# Patient Record
Sex: FEMALE | Race: BLACK OR AFRICAN AMERICAN | NOT HISPANIC OR LATINO | Employment: UNEMPLOYED | URBAN - METROPOLITAN AREA
[De-identification: names, ages, dates, MRNs, and addresses within clinical notes are randomized per-mention and may not be internally consistent; named-entity substitution may affect disease eponyms.]

---

## 2023-03-22 ENCOUNTER — TELEMEDICINE (OUTPATIENT)
Dept: PHARMACY | Facility: HOSPITAL | Age: 63
End: 2023-03-22
Payer: MEDICARE

## 2023-03-22 DIAGNOSIS — R79.89 ELEVATED PARATHYROID HORMONE: Primary | ICD-10-CM

## 2023-03-22 DIAGNOSIS — Z78.0 ASYMPTOMATIC MENOPAUSAL STATE: Primary | ICD-10-CM

## 2023-03-22 DIAGNOSIS — E11.9 TYPE 2 DIABETES MELLITUS WITHOUT COMPLICATION, WITHOUT LONG-TERM CURRENT USE OF INSULIN (MULTI): ICD-10-CM

## 2023-03-22 DIAGNOSIS — E11.9 TYPE 2 DIABETES MELLITUS WITHOUT COMPLICATION, WITHOUT LONG-TERM CURRENT USE OF INSULIN (MULTI): Primary | ICD-10-CM

## 2023-03-22 RX ORDER — IPRATROPIUM BROMIDE AND ALBUTEROL SULFATE 2.5; .5 MG/3ML; MG/3ML
3 SOLUTION RESPIRATORY (INHALATION)
COMMUNITY
Start: 2022-11-16

## 2023-03-22 RX ORDER — HYDROXYCHLOROQUINE SULFATE 200 MG/1
1 TABLET, FILM COATED ORAL 2 TIMES DAILY
COMMUNITY
Start: 2015-06-05 | End: 2023-07-24

## 2023-03-22 RX ORDER — BIMATOPROST 0.1 MG/ML
1 SOLUTION/ DROPS OPHTHALMIC NIGHTLY
COMMUNITY
Start: 2022-10-26

## 2023-03-22 RX ORDER — ERGOCALCIFEROL 1.25 MG/1
50000 CAPSULE ORAL
COMMUNITY
Start: 2020-09-27

## 2023-03-22 RX ORDER — HYDROCHLOROTHIAZIDE 12.5 MG/1
12.5 TABLET ORAL DAILY PRN
COMMUNITY
Start: 2023-03-09 | End: 2023-05-22 | Stop reason: SDUPTHER

## 2023-03-22 RX ORDER — ALBUTEROL SULFATE 90 UG/1
1-2 AEROSOL, METERED RESPIRATORY (INHALATION)
COMMUNITY
Start: 2022-11-16 | End: 2024-01-24 | Stop reason: SDUPTHER

## 2023-03-22 RX ORDER — DORZOLAMIDE HYDROCHLORIDE AND TIMOLOL MALEATE 20; 5 MG/ML; MG/ML
1 SOLUTION/ DROPS OPHTHALMIC 2 TIMES DAILY
COMMUNITY
Start: 2023-03-15

## 2023-03-22 RX ORDER — MELOXICAM 15 MG/1
15 TABLET ORAL DAILY
COMMUNITY
End: 2024-01-24 | Stop reason: SDUPTHER

## 2023-03-22 RX ORDER — METFORMIN HYDROCHLORIDE 500 MG/1
500 TABLET, EXTENDED RELEASE ORAL DAILY
COMMUNITY
Start: 2023-01-19 | End: 2023-05-22 | Stop reason: SDUPTHER

## 2023-03-22 RX ORDER — LOSARTAN POTASSIUM 50 MG/1
1 TABLET ORAL 2 TIMES DAILY
COMMUNITY
Start: 2023-03-09 | End: 2023-06-23

## 2023-03-22 RX ORDER — LATANOPROST 50 UG/ML
1 SOLUTION/ DROPS OPHTHALMIC NIGHTLY
COMMUNITY
Start: 2023-01-18

## 2023-03-22 RX ORDER — NETARSUDIL 0.2 MG/ML
1 SOLUTION/ DROPS OPHTHALMIC; TOPICAL EVERY EVENING
COMMUNITY
Start: 2022-03-30

## 2023-03-22 RX ORDER — MONTELUKAST SODIUM 10 MG/1
10 TABLET ORAL NIGHTLY
COMMUNITY
End: 2024-01-24 | Stop reason: SDUPTHER

## 2023-03-22 RX ORDER — FLUTICASONE PROPIONATE 50 MCG
2 SPRAY, SUSPENSION (ML) NASAL DAILY
COMMUNITY
Start: 2022-05-05

## 2023-03-22 RX ORDER — BRIMONIDINE TARTRATE 2 MG/ML
1 SOLUTION/ DROPS OPHTHALMIC 2 TIMES DAILY
COMMUNITY
Start: 2023-02-03

## 2023-03-22 NOTE — PROGRESS NOTES
Subjective   Patient ID: Marcella Dean is a 62 y.o. female who presents for Diabetes.  Diabetes  She presents for her follow-up diabetic visit. She has type 2 diabetes mellitus. Her disease course has been stable. There are no hypoglycemic associated symptoms. There are no hypoglycemic complications. Current diabetic treatment includes oral agent (monotherapy). She is compliant with treatment all of the time. She is following a generally healthy and diabetic diet. Prior visit with dietitian: Scheduling soon. Exercise: Plans to start. An ACE inhibitor/angiotensin II receptor blocker is being taken. Eye exam is current.       Referring Provider: TARA Sofia-CNP     Objective     There were no vitals taken for this visit.     LAB  Lab Results   Component Value Date    BILITOT 0.7 11/16/2022    CALCIUM 10.0 11/16/2022    CO2 29 11/16/2022     11/16/2022    CREATININE 0.69 11/16/2022    GLUCOSE 102 (H) 11/16/2022    ALKPHOS 61 11/16/2022    K 4.1 11/16/2022    PROT 8.2 11/16/2022     11/16/2022    AST 14 11/16/2022    ALT 11 11/16/2022    BUN 8 11/16/2022    ANIONGAP 14 11/16/2022    ALBUMIN 4.6 11/16/2022    GFRF >90 11/16/2022     No results found for: TRIG, CHOL, LDLCALC, HDL  Lab Results   Component Value Date    HGBA1C 7.0 (A) 11/16/2022       Assessment/Plan         Problem List Items Addressed This Visit          Endocrine/Metabolic     Patient reports well controlled SMBG readings. No SHARMILA from Metformin. Focused on lifestyle changes.     Continue Metformin          Type 2 diabetes mellitus without complication, without long-term current use of insulin (CMS/[facility])     Follow-up in 4 weeks     Brittny Pittman PharmD [facility]  Clinical Pharmacist     Verbal consent to manage patient's drug therapy was obtained from the patient. They were informed they may decline to participate or withdraw from participation in pharmacy services at any time.

## 2023-03-22 NOTE — ASSESSMENT & PLAN NOTE
Patient reports well controlled SMBG readings. No SHARMILA from Metformin. Focused on lifestyle changes.     Continue Metformin

## 2023-04-06 ENCOUNTER — TELEMEDICINE (OUTPATIENT)
Dept: PRIMARY CARE | Facility: CLINIC | Age: 63
End: 2023-04-06
Payer: MEDICARE

## 2023-04-06 DIAGNOSIS — R42 DIZZINESS: ICD-10-CM

## 2023-04-06 DIAGNOSIS — E11.9 TYPE 2 DIABETES MELLITUS WITHOUT COMPLICATION, WITHOUT LONG-TERM CURRENT USE OF INSULIN (MULTI): Primary | ICD-10-CM

## 2023-04-06 DIAGNOSIS — G47.33 OSA (OBSTRUCTIVE SLEEP APNEA): ICD-10-CM

## 2023-04-06 LAB
ALBUMIN (MG/L) IN URINE: 9.5 MG/L
ALBUMIN/CREATININE (UG/MG) IN URINE: 6.4 UG/MG CRT (ref 0–30)
ANION GAP IN SER/PLAS: 13 MMOL/L (ref 10–20)
CALCIUM (MG/DL) IN SER/PLAS: 10 MG/DL (ref 8.6–10.6)
CARBON DIOXIDE, TOTAL (MMOL/L) IN SER/PLAS: 29 MMOL/L (ref 21–32)
CHLORIDE (MMOL/L) IN SER/PLAS: 102 MMOL/L (ref 98–107)
CHOLESTEROL IN LDL (MG/DL) IN SER/PLAS BY DIRECT ASSAY: 133 MG/DL (ref 0–129)
CREATININE (MG/DL) IN SER/PLAS: 0.69 MG/DL (ref 0.5–1.05)
CREATININE (MG/DL) IN URINE: 148 MG/DL (ref 20–320)
ESTIMATED AVERAGE GLUCOSE FOR HBA1C: 157 MG/DL
GFR FEMALE: >90 ML/MIN/1.73M2
GLUCOSE (MG/DL) IN SER/PLAS: 114 MG/DL (ref 74–99)
HEMOGLOBIN A1C/HEMOGLOBIN TOTAL IN BLOOD: 7.1 %
POTASSIUM (MMOL/L) IN SER/PLAS: 3.5 MMOL/L (ref 3.5–5.3)
SODIUM (MMOL/L) IN SER/PLAS: 140 MMOL/L (ref 136–145)
UREA NITROGEN (MG/DL) IN SER/PLAS: 14 MG/DL (ref 6–23)

## 2023-04-06 PROCEDURE — 99215 OFFICE O/P EST HI 40 MIN: CPT | Performed by: NURSE PRACTITIONER

## 2023-04-06 NOTE — PROGRESS NOTES
Subjective   Patient ID: Marcella Dean is a 62 y.o. female who presents for Follow-up (Diabetes sleep apnea//).  The patient does not know how to use her cpap machine and would like to be shown how to use it  , put on the mask etc   She can follow up in Sleep medicine to assist her or come back to the office with her machine for a follow up   She gets very dizzy at times   She thinks it may be from taking the metformin but is not sure what is causing it .   She thinks when she started to take the metformin she started to get.   Her last aic level is 7.1 the aic level before that was 7.1   She has not checked her sugar when she is dizzy. She has had a borderline low tsh in the past but the last one done was normal in November 22 .  I suggested she get a pulse oximeter and try it when she feels like that .   I will have a pharmacist address her use of metformin  to see if she can use another use of another medications for her diabetes . She may need to drink more water.        Review of Systems    Objective   There were no vitals taken for this visit.    Assessment/Plan      Type 2 diabetes without long term use of insulin  VIOLETA   Dizziness      Referral to adult sleep medicine   Referral to clinical pharmacy

## 2023-04-17 ENCOUNTER — APPOINTMENT (OUTPATIENT)
Dept: PHARMACY | Facility: HOSPITAL | Age: 63
End: 2023-04-17
Payer: MEDICARE

## 2023-04-24 ENCOUNTER — TELEMEDICINE (OUTPATIENT)
Dept: PHARMACY | Facility: HOSPITAL | Age: 63
End: 2023-04-24
Payer: MEDICARE

## 2023-04-24 DIAGNOSIS — E11.9 TYPE 2 DIABETES MELLITUS WITHOUT COMPLICATION, WITHOUT LONG-TERM CURRENT USE OF INSULIN (MULTI): ICD-10-CM

## 2023-04-24 RX ORDER — ROSUVASTATIN CALCIUM 5 MG/1
5 TABLET, COATED ORAL DAILY
Qty: 90 TABLET | Refills: 0 | Status: SHIPPED | OUTPATIENT
Start: 2023-04-24 | End: 2023-05-26

## 2023-04-24 NOTE — ASSESSMENT & PLAN NOTE
Home BG within range. 1 PPG reading of 185 mg/dL. Not sure dizziness is related to Metformin.     1 .CONTINUE Metformin  mg once daily     Consider switch to Farxiga in future if dizziness persists.     LDL Elevated: Start Statin     START Rosuvastatin 5 mg once daily

## 2023-04-24 NOTE — PROGRESS NOTES
Subjective   Patient ID: Marcella Dean is a 62 y.o. female who presents for Diabetes.  Diabetes  She presents for her follow-up diabetic visit. She has type 2 diabetes mellitus. Her disease course has been stable. There are no hypoglycemic associated symptoms. There are no hypoglycemic complications. Current diabetic treatment includes oral agent (monotherapy). She is compliant with treatment all of the time. She is following a generally healthy and diabetic diet. Prior visit with dietitian: Scheduling soon. Exercise: Plans to start. An ACE inhibitor/angiotensin II receptor blocker is being taken. Eye exam is current.       Referring Provider: TARA Sofia-CNP     Objective     There were no vitals taken for this visit.     LAB  Lab Results   Component Value Date    BILITOT 0.7 11/16/2022    CALCIUM 10.0 04/06/2023    CO2 29 04/06/2023     04/06/2023    CREATININE 0.69 04/06/2023    GLUCOSE 114 (H) 04/06/2023    ALKPHOS 61 11/16/2022    K 3.5 04/06/2023    PROT 8.2 11/16/2022     04/06/2023    AST 14 11/16/2022    ALT 11 11/16/2022    BUN 14 04/06/2023    ANIONGAP 13 04/06/2023    ALBUMIN 4.6 11/16/2022    GFRF >90 04/06/2023     No results found for: TRIG, CHOL, LDLCALC, HDL  Lab Results   Component Value Date    HGBA1C 7.1 (A) 04/06/2023     The ASCVD Risk score (Carrie DK, et al., 2019) failed to calculate for the following reasons:    Cannot find a previous HDL lab    Cannot find a previous total cholesterol lab    ASCVD 10-year risk manually calculated using  and HDL 46 from Lipid panel in 2017. Calculated to be 19.7%    Assessment/Plan         Problem List Items Addressed This Visit          Endocrine/Metabolic    Type 2 diabetes mellitus without complication, without long-term current use of insulin (CMS/[facility])     Home BG within range. 1 PPG reading of 185 mg/dL. Not sure dizziness is related to Metformin.     1 .CONTINUE Metformin  mg once daily     Consider switch to  Farxiga in future if dizziness persists.     LDL Elevated: Start Statin     START Rosuvastatin 5 mg once daily          Relevant Orders    Follow Up In [facility] - Pharmacy     Follow-up in 4 weeks - Patient counseled on how to take Rosuvastatin and SE. Patient Handout on dietary considerations for high cholesterol emailed.     Brittny Pittman PharmD [facility]  Clinical Pharmacist     Verbal consent to manage patient's drug therapy was obtained from the patient. They were informed they may decline to participate or withdraw from participation in pharmacy services at any time.

## 2023-05-22 ENCOUNTER — TELEMEDICINE (OUTPATIENT)
Dept: PHARMACY | Facility: HOSPITAL | Age: 63
End: 2023-05-22
Payer: MEDICARE

## 2023-05-22 DIAGNOSIS — I10 PRIMARY HYPERTENSION: ICD-10-CM

## 2023-05-22 DIAGNOSIS — E11.9 TYPE 2 DIABETES MELLITUS WITHOUT COMPLICATION, WITHOUT LONG-TERM CURRENT USE OF INSULIN (MULTI): Primary | ICD-10-CM

## 2023-05-22 RX ORDER — HYDROCHLOROTHIAZIDE 12.5 MG/1
12.5 TABLET ORAL 2 TIMES DAILY
Qty: 60 TABLET | Refills: 2 | Status: SHIPPED | OUTPATIENT
Start: 2023-05-22 | End: 2023-10-27 | Stop reason: SDUPTHER

## 2023-05-22 RX ORDER — METFORMIN HYDROCHLORIDE 500 MG/1
500 TABLET, EXTENDED RELEASE ORAL 2 TIMES DAILY
Qty: 60 TABLET | Refills: 2 | Status: SHIPPED | OUTPATIENT
Start: 2023-05-22 | End: 2024-01-05 | Stop reason: ALTCHOICE

## 2023-05-22 NOTE — PROGRESS NOTES
Subjective     Patient ID: Marcella Dean is a 62 y.o. female who presents for Diabetes.    Diabetes  She presents for her follow-up diabetic visit. She has type 2 diabetes mellitus. Her disease course has been worsening. She is compliant with treatment most of the time. Eye exam is current.     Recent complications with eyes. Had not yet started Rosuvastatin. Will start today.     Allergies   Allergen Reactions    Codeine Nausea Only, Rash and Other       Objective     Current DM Pharmacotherapy:   Metformin  mg -  1 tablet daily     SECONDARY PREVENTION  - Statin? No  - ACE-I/ARB? Yes  - Aspirin? No    Current monitoring regimen:   Patient is using: glucometer    SMBG Readings:   127-132 mg/dL FBG  150's mg/dL PPG     Any episodes of hypoglycemia? No  Hypoglycemia awareness? Yes    There were no vitals taken for this visit.    Pertinent PMH Review:  - PMH of Pancreatitis: No  - PMH/FH of Medullary Thyroid Cancer: No  - PMH of Retinopathy: Yes  - PMH of Urinary Tract Infections: No    Lab Review  Lab Results   Component Value Date    BILITOT 0.7 11/16/2022    CALCIUM 10.0 04/06/2023    CO2 29 04/06/2023     04/06/2023    CREATININE 0.69 04/06/2023    GLUCOSE 114 (H) 04/06/2023    ALKPHOS 61 11/16/2022    K 3.5 04/06/2023    PROT 8.2 11/16/2022     04/06/2023    AST 14 11/16/2022    ALT 11 11/16/2022    BUN 14 04/06/2023    ANIONGAP 13 04/06/2023    ALBUMIN 4.6 11/16/2022    GFRF >90 04/06/2023     No results found for: TRIG, CHOL, LDL, LDLCALC, HDL  Lab Results   Component Value Date    HGBA1C 7.1 (A) 04/06/2023     The ASCVD Risk score (Carrie QUIÑONEZ, et al., 2019) failed to calculate for the following reasons:    Cannot find a previous HDL lab    Cannot find a previous total cholesterol lab      Assessment/Plan     Problem List Items Addressed This Visit          Endocrine/Metabolic    Type 2 diabetes mellitus without complication, without long-term current use of insulin (CMS/[facility]) - Primary     A1C  slightly elevated. Dizziness resolved, more adherent to Metformin now.     INCREASE to Metformin  mg - 1 tablet twice daily          Relevant Medications    metFORMIN XR (Glucophage-XR) 500 mg 24 hr tablet     Other Visit Diagnoses       Primary hypertension        Relevant Medications    hydroCHLOROthiazide (HYDRODiuril) 12.5 mg tablet            Type 2 diabetes mellitus, is not at goal. Goal <7%    Follow up: I recommend diabetes care be 2 weeks.  START Rosuvastatin   Refills sent for Metformin and hydrochlorothiazide     Brittny Pittman PharmD [facility]  Clinical Pharmacist     Continue all meds under the continuation of care with the referring provider and clinical pharmacy team

## 2023-05-22 NOTE — ASSESSMENT & PLAN NOTE
A1C slightly elevated. Dizziness resolved, more adherent to Metformin now.     INCREASE to Metformin  mg - 1 tablet twice daily

## 2023-05-26 DIAGNOSIS — E11.9 TYPE 2 DIABETES MELLITUS WITHOUT COMPLICATION, WITHOUT LONG-TERM CURRENT USE OF INSULIN (MULTI): ICD-10-CM

## 2023-05-26 RX ORDER — ROSUVASTATIN CALCIUM 5 MG/1
TABLET, COATED ORAL
Qty: 90 TABLET | Refills: 0 | Status: SHIPPED | OUTPATIENT
Start: 2023-05-26 | End: 2023-10-27 | Stop reason: SDUPTHER

## 2023-06-12 ENCOUNTER — TELEMEDICINE (OUTPATIENT)
Dept: PHARMACY | Facility: HOSPITAL | Age: 63
End: 2023-06-12
Payer: MEDICARE

## 2023-06-12 DIAGNOSIS — E11.9 TYPE 2 DIABETES MELLITUS WITHOUT COMPLICATION, WITHOUT LONG-TERM CURRENT USE OF INSULIN (MULTI): ICD-10-CM

## 2023-06-12 NOTE — ASSESSMENT & PLAN NOTE
No SHARMILA with increase dose of Metformin. Met with Endocrinologist who suggested Ozempic or Trulicity as alternative to Metformin pending clearance from Ophthalmology due to concern for worsening diabetic retinopathy. Patient to call after appointment next week. Reviewed key differences and side effects of GLP-1 RA options. Both Trulicity and Ozempic covered through insurance for ~$11 copay per month.

## 2023-06-12 NOTE — PROGRESS NOTES
"Subjective     Patient ID: Marcella eDan is a 62 y.o. female who presents for Diabetes.    Diabetes  She presents for her follow-up diabetic visit. She has type 2 diabetes mellitus. Her disease course has been stable. She is compliant with treatment most of the time. Eye exam is current.     Recent complications with eyes. Now seeing ophthalmology every 3 weeks. Endocrinologist would like ophthalmology to clear patient for use of GLP-1 RA.     Current Weight: 167 lbs (pt reported)     Allergies   Allergen Reactions    Codeine Nausea Only, Rash and Other       Objective     Current DM Pharmacotherapy:   Metformin  mg -  1 tablet twice daily     SECONDARY PREVENTION  - Statin? No  - ACE-I/ARB? Yes  - Aspirin? No    Current monitoring regimen:   Patient is using: glucometer    SMBG Readings:   Not available at time of appointmet     Any episodes of hypoglycemia? No  Hypoglycemia awareness? Yes    There were no vitals taken for this visit.    Pertinent PMH Review:  - PMH of Pancreatitis: No  - PMH/FH of Medullary Thyroid Cancer: No  - PMH of Retinopathy: Yes  - PMH of Urinary Tract Infections: No    Lab Review  Lab Results   Component Value Date    BILITOT 0.7 11/16/2022    CALCIUM 10.0 04/06/2023    CO2 29 04/06/2023     04/06/2023    CREATININE 0.69 04/06/2023    GLUCOSE 114 (H) 04/06/2023    ALKPHOS 61 11/16/2022    K 3.5 04/06/2023    PROT 8.2 11/16/2022     04/06/2023    AST 14 11/16/2022    ALT 11 11/16/2022    BUN 14 04/06/2023    ANIONGAP 13 04/06/2023    ALBUMIN 4.6 11/16/2022    GFRF >90 04/06/2023     No results found for: \"TRIG\", \"CHOL\", \"LDL\", \"LDLCALC\", \"HDL\"  Lab Results   Component Value Date    HGBA1C 7.1 (A) 04/06/2023     The ASCVD Risk score (Carrie QUIÑONEZ, et al., 2019) failed to calculate for the following reasons:    Cannot find a previous HDL lab    Cannot find a previous total cholesterol lab      Assessment/Plan     Problem List Items Addressed This Visit          " Endocrine/Metabolic    Type 2 diabetes mellitus without complication, without long-term current use of insulin (CMS/[facility])     No SHARMILA with increase dose of Metformin. Met with Endocrinologist who suggested Ozempic or Trulicity as alternative to Metformin pending clearance from Ophthalmology due to concern for worsening diabetic retinopathy. Patient to call after appointment next week. Reviewed key differences and side effects of GLP-1 RA options. Both Trulicity and Ozempic covered through insurance for ~$11 copay per month.             Type 2 diabetes mellitus, is not at goal. Goal <7%    Follow up: I recommend diabetes care be following ophthalmology appointment next week.       Brittny LarryD [facility]  Clinical Pharmacist     Continue all meds under the continuation of care with the referring provider and clinical pharmacy team

## 2023-07-11 ENCOUNTER — TELEMEDICINE (OUTPATIENT)
Dept: PHARMACY | Facility: HOSPITAL | Age: 63
End: 2023-07-11
Payer: MEDICARE

## 2023-07-11 DIAGNOSIS — E11.9 TYPE 2 DIABETES MELLITUS WITHOUT COMPLICATION, WITHOUT LONG-TERM CURRENT USE OF INSULIN (MULTI): Primary | ICD-10-CM

## 2023-07-11 DIAGNOSIS — E11.9 TYPE 2 DIABETES MELLITUS WITHOUT COMPLICATION, WITHOUT LONG-TERM CURRENT USE OF INSULIN (MULTI): ICD-10-CM

## 2023-07-11 RX ORDER — DULAGLUTIDE 0.75 MG/.5ML
0.75 INJECTION, SOLUTION SUBCUTANEOUS
Qty: 2 ML | Refills: 1 | Status: SHIPPED | OUTPATIENT
Start: 2023-07-11 | End: 2023-09-08

## 2023-07-11 NOTE — PROGRESS NOTES
"Subjective     Patient ID: Marcella Dean is a 62 y.o. female who presents for Diabetes.    Diabetes  She presents for her follow-up diabetic visit. She has type 2 diabetes mellitus. Her disease course has been stable. She is compliant with treatment most of the time. Eye exam is current.     Recent complications with eyes. Now seeing ophthalmology every 3 weeks. Cleared by ophthalmology to start Trulicity.     Current Weight: 167 lbs (pt reported)     Allergies   Allergen Reactions    Codeine Nausea Only, Rash and Other       Objective     Current DM Pharmacotherapy:   Metformin  mg -  1 tablet twice daily     SECONDARY PREVENTION  - Statin? No  - ACE-I/ARB? Yes  - Aspirin? No    Current monitoring regimen:   Patient is using: glucometer    SMBG Readings:   Not available at time of appointmet     Any episodes of hypoglycemia? No  Hypoglycemia awareness? Yes    There were no vitals taken for this visit.    Pertinent PMH Review:  - PMH of Pancreatitis: No  - PMH/FH of Medullary Thyroid Cancer: No  - PMH of Retinopathy: Yes  - PMH of Urinary Tract Infections: No    Lab Review  Lab Results   Component Value Date    BILITOT 0.7 11/16/2022    CALCIUM 10.0 04/06/2023    CO2 29 04/06/2023     04/06/2023    CREATININE 0.69 04/06/2023    GLUCOSE 114 (H) 04/06/2023    ALKPHOS 61 11/16/2022    K 3.5 04/06/2023    PROT 8.2 11/16/2022     04/06/2023    AST 14 11/16/2022    ALT 11 11/16/2022    BUN 14 04/06/2023    ANIONGAP 13 04/06/2023    ALBUMIN 4.6 11/16/2022    GFRF >90 04/06/2023     No results found for: \"TRIG\", \"CHOL\", \"LDL\", \"LDLCALC\", \"HDL\"  Lab Results   Component Value Date    HGBA1C 7.1 (A) 04/06/2023     The ASCVD Risk score (Carrie QUIÑONEZ, et al., 2019) failed to calculate for the following reasons:    Cannot find a previous HDL lab    Cannot find a previous total cholesterol lab      Assessment/Plan     Problem List Items Addressed This Visit       Type 2 diabetes mellitus without complication, " without long-term current use of insulin (CMS/[facility])     START Trulicity 0.75mg/0.5 mL - once weekly  CONTINUE:   Metformin  mg - 1 tab twice daily           Trulicity Education:    - Counseled patient on Trulicity MOA, expectations, side effects, duration of therapy, administration, and monitoring parameters.  - Provided detailed dosing and administration counseling to ensure proper technique.   - Reviewed Trulicity titration schedule, starting with 0.75 mg once weekly to 1.5 mg, 3 mg, and if tolerated 4.5 mg.  - Counseled patient on the benefits of GLP-1ra, such as cardiovascular risk reduction, glycemic control, and weight loss potential.  - Reviewed storage requirements of Trulicity when not in use, and when to administer the medication if a dose is missed.  - Advised patient that they may experience improved satiety after meals and portion sizes of meals may be reduced as doses of Trulicity increase.    Rx sent to patients preferred pharmacy: [facility] in [place]     Type 2 diabetes mellitus, is not at goal. Goal <7%    Follow up: I recommend diabetes care be 3 weeks      Brittny LarryD [facility]  Clinical Pharmacist     Continue all meds under the continuation of care with the referring provider and clinical pharmacy team

## 2023-07-21 DIAGNOSIS — Z79.899 LONG-TERM USE OF PLAQUENIL: ICD-10-CM

## 2023-07-21 DIAGNOSIS — M35.1 MIXED CONNECTIVE TISSUE DISEASE (MULTI): ICD-10-CM

## 2023-07-21 DIAGNOSIS — H40.9 GLAUCOMA OF BOTH EYES, UNSPECIFIED GLAUCOMA TYPE: Primary | ICD-10-CM

## 2023-07-21 DIAGNOSIS — L93.0 DISCOID LUPUS ERYTHEMATOSUS: ICD-10-CM

## 2023-07-24 RX ORDER — HYDROXYCHLOROQUINE SULFATE 200 MG/1
TABLET, FILM COATED ORAL
Qty: 180 TABLET | Refills: 0 | Status: SHIPPED | OUTPATIENT
Start: 2023-07-24 | End: 2024-01-24 | Stop reason: SDUPTHER

## 2023-08-01 DIAGNOSIS — E78.2 MIXED HYPERLIPIDEMIA: Primary | ICD-10-CM

## 2023-08-02 ENCOUNTER — TELEPHONE (OUTPATIENT)
Dept: PRIMARY CARE | Facility: CLINIC | Age: 63
End: 2023-08-02

## 2023-08-02 PROBLEM — H40.9 GLAUCOMA: Status: ACTIVE | Noted: 2018-01-18

## 2023-08-02 PROBLEM — N95.1 MENOPAUSAL SYMPTOMS: Status: ACTIVE | Noted: 2023-08-02

## 2023-08-02 PROBLEM — R07.89 ATYPICAL CHEST PAIN: Status: ACTIVE | Noted: 2023-08-02

## 2023-08-02 PROBLEM — L81.9 HYPERPIGMENTED SKIN LESION: Status: ACTIVE | Noted: 2023-08-02

## 2023-08-02 PROBLEM — K21.9 GASTROESOPHAGEAL REFLUX DISEASE: Status: ACTIVE | Noted: 2021-10-14

## 2023-08-02 PROBLEM — H91.90 HEARING LOSS: Status: ACTIVE | Noted: 2023-08-02

## 2023-08-02 PROBLEM — K80.20 GALLSTONE: Status: ACTIVE | Noted: 2020-12-22

## 2023-08-02 PROBLEM — G47.33 OSA (OBSTRUCTIVE SLEEP APNEA): Status: ACTIVE | Noted: 2023-08-02

## 2023-08-02 PROBLEM — F32.A DEPRESSION: Status: ACTIVE | Noted: 2023-08-02

## 2023-08-02 PROBLEM — R03.0 ELEVATED BLOOD-PRESSURE READING WITHOUT DIAGNOSIS OF HYPERTENSION: Status: ACTIVE | Noted: 2021-10-14

## 2023-08-02 PROBLEM — R79.89 ABNORMAL THYROID STIMULATING HORMONE LEVEL: Status: ACTIVE | Noted: 2023-08-02

## 2023-08-02 PROBLEM — L93.0 DISCOID LUPUS ERYTHEMATOSUS: Status: ACTIVE | Noted: 2021-10-14

## 2023-08-02 PROBLEM — R35.0 INCREASED FREQUENCY OF URINATION: Status: ACTIVE | Noted: 2021-10-14

## 2023-08-02 PROBLEM — R42 DIZZINESS: Status: ACTIVE | Noted: 2023-08-02

## 2023-08-02 PROBLEM — E11.9 DIABETES MELLITUS (MULTI): Status: ACTIVE | Noted: 2023-08-02

## 2023-08-02 PROBLEM — J30.9 ALLERGIC RHINITIS: Status: ACTIVE | Noted: 2021-10-14

## 2023-08-02 PROBLEM — M35.1 MCTD (MIXED CONNECTIVE TISSUE DISEASE) (MULTI): Status: ACTIVE | Noted: 2023-08-02

## 2023-08-02 PROBLEM — R73.09 ELEVATED HEMOGLOBIN A1C: Status: ACTIVE | Noted: 2023-08-02

## 2023-08-02 PROBLEM — G89.18 ACUTE POST-OPERATIVE PAIN: Status: ACTIVE | Noted: 2023-08-02

## 2023-08-02 PROBLEM — M79.7 PRIMARY FIBROMYALGIA SYNDROME: Status: ACTIVE | Noted: 2021-10-14

## 2023-08-02 PROBLEM — H61.23 BILATERAL IMPACTED CERUMEN: Status: ACTIVE | Noted: 2021-10-14

## 2023-08-02 PROBLEM — J45.909 ASTHMA (HHS-HCC): Status: ACTIVE | Noted: 2023-08-02

## 2023-08-02 PROBLEM — E55.9 VITAMIN D DEFICIENCY: Status: ACTIVE | Noted: 2021-10-14

## 2023-08-02 PROBLEM — I10 ESSENTIAL HYPERTENSION: Status: ACTIVE | Noted: 2021-10-14

## 2023-08-02 PROBLEM — M32.9 SYSTEMIC LUPUS ERYTHEMATOSUS (MULTI): Status: ACTIVE | Noted: 2018-01-18

## 2023-08-02 PROBLEM — E66.3 OVERWEIGHT (BMI 25.0-29.9): Status: ACTIVE | Noted: 2023-08-02

## 2023-08-02 PROBLEM — R47.02 DYSPHASIA: Status: ACTIVE | Noted: 2021-10-14

## 2023-08-02 PROBLEM — F32.9 REACTIVE DEPRESSION: Status: ACTIVE | Noted: 2021-10-14

## 2023-08-02 PROBLEM — R06.83 SNORING: Status: ACTIVE | Noted: 2023-08-02

## 2023-08-02 PROBLEM — M25.50 ARTHRALGIA: Status: ACTIVE | Noted: 2021-10-14

## 2023-08-02 PROBLEM — R94.31 ABNORMAL EKG: Status: ACTIVE | Noted: 2020-08-18

## 2023-08-02 PROBLEM — G47.30 SLEEP-DISORDERED BREATHING: Status: ACTIVE | Noted: 2023-08-02

## 2023-08-02 PROBLEM — Z90.49 S/P LAPAROSCOPIC CHOLECYSTECTOMY: Status: ACTIVE | Noted: 2023-08-02

## 2023-08-02 PROBLEM — E05.00 GOITER WITH HYPERTHYROIDISM: Status: ACTIVE | Noted: 2023-08-02

## 2023-08-10 LAB
ALANINE AMINOTRANSFERASE (SGPT) (U/L) IN SER/PLAS: 9 U/L (ref 7–45)
ALBUMIN (G/DL) IN SER/PLAS: 4.2 G/DL (ref 3.4–5)
ALKALINE PHOSPHATASE (U/L) IN SER/PLAS: 55 U/L (ref 33–136)
ANION GAP IN SER/PLAS: 12 MMOL/L (ref 10–20)
ASPARTATE AMINOTRANSFERASE (SGOT) (U/L) IN SER/PLAS: 13 U/L (ref 9–39)
BILIRUBIN TOTAL (MG/DL) IN SER/PLAS: 0.6 MG/DL (ref 0–1.2)
CALCIUM (MG/DL) IN SER/PLAS: 10.2 MG/DL (ref 8.6–10.6)
CARBON DIOXIDE, TOTAL (MMOL/L) IN SER/PLAS: 30 MMOL/L (ref 21–32)
CHLORIDE (MMOL/L) IN SER/PLAS: 104 MMOL/L (ref 98–107)
CHOLESTEROL (MG/DL) IN SER/PLAS: 140 MG/DL (ref 0–199)
CHOLESTEROL IN HDL (MG/DL) IN SER/PLAS: 39.6 MG/DL
CHOLESTEROL/HDL RATIO: 3.5
CREATININE (MG/DL) IN SER/PLAS: 0.72 MG/DL (ref 0.5–1.05)
ESTIMATED AVERAGE GLUCOSE FOR HBA1C: 148 MG/DL
GFR FEMALE: >90 ML/MIN/1.73M2
GLUCOSE (MG/DL) IN SER/PLAS: 114 MG/DL (ref 74–99)
HEMOGLOBIN A1C/HEMOGLOBIN TOTAL IN BLOOD: 6.8 %
LDL: 84 MG/DL (ref 0–99)
LIPASE (U/L) IN SER/PLAS: 29 U/L (ref 9–82)
POTASSIUM (MMOL/L) IN SER/PLAS: 4 MMOL/L (ref 3.5–5.3)
PROTEIN TOTAL: 7.7 G/DL (ref 6.4–8.2)
SODIUM (MMOL/L) IN SER/PLAS: 142 MMOL/L (ref 136–145)
THYROPEROXIDASE AB (IU/ML) IN SER/PLAS: <28 IU/ML
THYROTROPIN (MIU/L) IN SER/PLAS BY DETECTION LIMIT <= 0.05 MIU/L: 1.1 MIU/L (ref 0.44–3.98)
THYROXINE (T4) FREE (NG/DL) IN SER/PLAS: 0.98 NG/DL (ref 0.78–1.48)
TRIGLYCERIDE (MG/DL) IN SER/PLAS: 84 MG/DL (ref 0–149)
TRIIODOTHYRONINE (T3) (NG/DL) IN SER/PLAS: 122 NG/DL (ref 60–200)
UREA NITROGEN (MG/DL) IN SER/PLAS: 9 MG/DL (ref 6–23)
VLDL: 17 MG/DL (ref 0–40)

## 2023-08-14 LAB — CALCITONIN: <2 PG/ML (ref 0–5.1)

## 2023-08-17 LAB — THYROID STIMULATING IMMUNOGLOBULIN: <1 TSI INDEX

## 2023-08-27 PROBLEM — R73.9 HYPERGLYCEMIA: Status: ACTIVE | Noted: 2021-10-14

## 2023-08-27 PROBLEM — E11.9 DIABETES MELLITUS (MULTI): Status: RESOLVED | Noted: 2023-08-02 | Resolved: 2023-08-27

## 2023-08-27 PROBLEM — Z79.899 LONG-TERM USE OF HYDROXYCHLOROQUINE: Status: ACTIVE | Noted: 2023-08-27

## 2023-08-27 PROBLEM — H61.20 IMPACTED CERUMEN: Status: ACTIVE | Noted: 2021-10-14

## 2023-08-27 RX ORDER — DORZOLAMIDE HCL 20 MG/ML
1 SOLUTION/ DROPS OPHTHALMIC 2 TIMES DAILY
COMMUNITY

## 2023-08-27 RX ORDER — TRIAMCINOLONE ACETONIDE 1 MG/G
1 CREAM TOPICAL 3 TIMES DAILY
COMMUNITY
End: 2024-01-24 | Stop reason: SDUPTHER

## 2023-08-31 ENCOUNTER — APPOINTMENT (OUTPATIENT)
Dept: PRIMARY CARE | Facility: CLINIC | Age: 63
End: 2023-08-31
Payer: MEDICARE

## 2023-10-25 DIAGNOSIS — I10 PRIMARY HYPERTENSION: ICD-10-CM

## 2023-10-25 DIAGNOSIS — E11.9 TYPE 2 DIABETES MELLITUS WITHOUT COMPLICATION, WITHOUT LONG-TERM CURRENT USE OF INSULIN (MULTI): ICD-10-CM

## 2023-10-27 ENCOUNTER — TELEMEDICINE (OUTPATIENT)
Dept: PHARMACY | Facility: HOSPITAL | Age: 63
End: 2023-10-27
Payer: MEDICARE

## 2023-10-27 DIAGNOSIS — I10 PRIMARY HYPERTENSION: ICD-10-CM

## 2023-10-27 DIAGNOSIS — E11.9 TYPE 2 DIABETES MELLITUS WITHOUT COMPLICATION, WITHOUT LONG-TERM CURRENT USE OF INSULIN (MULTI): Primary | ICD-10-CM

## 2023-10-27 DIAGNOSIS — E11.9 TYPE 2 DIABETES MELLITUS WITHOUT COMPLICATION, WITHOUT LONG-TERM CURRENT USE OF INSULIN (MULTI): ICD-10-CM

## 2023-10-27 RX ORDER — LOSARTAN POTASSIUM 50 MG/1
50 TABLET ORAL 2 TIMES DAILY
Qty: 60 TABLET | Refills: 0 | Status: SHIPPED | OUTPATIENT
Start: 2023-10-27 | End: 2024-01-24 | Stop reason: SDUPTHER

## 2023-10-27 RX ORDER — LOSARTAN POTASSIUM 50 MG/1
TABLET ORAL
Qty: 180 TABLET | Refills: 0 | OUTPATIENT
Start: 2023-10-27

## 2023-10-27 RX ORDER — DULAGLUTIDE 0.75 MG/.5ML
0.75 INJECTION, SOLUTION SUBCUTANEOUS
Qty: 2 ML | Refills: 0 | Status: SHIPPED | OUTPATIENT
Start: 2023-10-27 | End: 2023-11-27 | Stop reason: ALTCHOICE

## 2023-10-27 RX ORDER — ROSUVASTATIN CALCIUM 5 MG/1
5 TABLET, COATED ORAL DAILY
Qty: 30 TABLET | Refills: 0 | Status: SHIPPED | OUTPATIENT
Start: 2023-10-27 | End: 2024-01-24 | Stop reason: SDUPTHER

## 2023-10-27 RX ORDER — HYDROCHLOROTHIAZIDE 12.5 MG/1
12.5 TABLET ORAL 2 TIMES DAILY
Qty: 60 TABLET | Refills: 0 | OUTPATIENT
Start: 2023-10-27

## 2023-10-27 RX ORDER — HYDROCHLOROTHIAZIDE 12.5 MG/1
12.5 TABLET ORAL 2 TIMES DAILY
Qty: 60 TABLET | Refills: 0 | Status: SHIPPED | OUTPATIENT
Start: 2023-10-27 | End: 2024-01-24 | Stop reason: SDUPTHER

## 2023-10-27 RX ORDER — ROSUVASTATIN CALCIUM 5 MG/1
5 TABLET, COATED ORAL DAILY
Qty: 90 TABLET | Refills: 0 | OUTPATIENT
Start: 2023-10-27

## 2023-10-27 NOTE — PROGRESS NOTES
Subjective     Patient ID: Marcella Dean is a 63 y.o. female who presents for Diabetes.    Diabetes  She presents for her follow-up diabetic visit. She has type 2 diabetes mellitus. Her disease course has been stable. She is compliant with treatment most of the time. Eye exam is current.     Recent complications with eyes. Now seeing ophthalmology every 3 weeks. Cleared by ophthalmology to start Trulicity. Has had good success with Trulicity and no further eye issues.     In need of refills on BP and HLD medications. Urged to establish care with Dr. Colindres or Dr. Gomez for follow-up for further refills.        Allergies   Allergen Reactions    Codeine Nausea Only, Rash and Other     NAUSEA, GI Upset    Oxycodone-Acetaminophen Other       Objective     Current DM Pharmacotherapy:   Trulicity 0.75 mg/0.5 mL - once weekly     SECONDARY PREVENTION  - Statin? No  - ACE-I/ARB? Yes  - Aspirin? No    Current monitoring regimen:   Patient is using: glucometer    SMBG Readings:   Not available at time of appointmet     Any episodes of hypoglycemia? No  Hypoglycemia awareness? Yes    There were no vitals taken for this visit.    Pertinent PMH Review:  - PMH of Pancreatitis: No  - PMH/FH of Medullary Thyroid Cancer: No  - PMH of Retinopathy: Yes  - PMH of Urinary Tract Infections: No    Lab Review  Lab Results   Component Value Date    BILITOT 0.6 08/10/2023    CALCIUM 10.2 08/10/2023    CO2 30 08/10/2023     08/10/2023    CREATININE 0.72 08/10/2023    GLUCOSE 114 (H) 08/10/2023    ALKPHOS 55 08/10/2023    K 4.0 08/10/2023    PROT 7.7 08/10/2023     08/10/2023    AST 13 08/10/2023    ALT 9 08/10/2023    BUN 9 08/10/2023    ANIONGAP 12 08/10/2023    ALBUMIN 4.2 08/10/2023    LIPASE 29 08/10/2023    GFRF >90 08/10/2023     Lab Results   Component Value Date    TRIG 84 08/10/2023    CHOL 140 08/10/2023    HDL 39.6 (A) 08/10/2023     Lab Results   Component Value Date    HGBA1C 6.8 (A) 08/10/2023     The 10-year  ASCVD risk score (Carrie QUIÑONEZ, et al., 2019) is: 12.2%    Values used to calculate the score:      Age: 63 years      Sex: Female      Is Non- : Yes      Diabetic: Yes      Tobacco smoker: No      Systolic Blood Pressure: 121 mmHg      Is BP treated: Yes      HDL Cholesterol: 39.6 mg/dL      Total Cholesterol: 140 mg/dL      Assessment/Plan     Problem List Items Addressed This Visit       Type 2 diabetes mellitus without complication, without long-term current use of insulin (CMS/[facility])     CONTINUE:   Trulicity 0.75 mg/0.5 mL - once weekly    Resume testing BG 1-2 times daily           Other Visit Diagnoses       Primary hypertension              Pt to schedule with new PCP and Endo provider     Type 2 diabetes mellitus, is not at goal. Goal <7%    Follow up: I recommend diabetes care be 2 weeks      Brittny LarryD [facility]  Clinical Pharmacist     Continue all meds under the continuation of care with the referring provider and clinical pharmacy team

## 2023-11-27 ENCOUNTER — TELEMEDICINE (OUTPATIENT)
Dept: PHARMACY | Facility: HOSPITAL | Age: 63
End: 2023-11-27
Payer: MEDICARE

## 2023-11-27 DIAGNOSIS — E11.9 TYPE 2 DIABETES MELLITUS WITHOUT COMPLICATION, WITHOUT LONG-TERM CURRENT USE OF INSULIN (MULTI): ICD-10-CM

## 2023-11-27 RX ORDER — DULAGLUTIDE 1.5 MG/.5ML
1.5 INJECTION, SOLUTION SUBCUTANEOUS
Qty: 2 ML | Refills: 1 | Status: SHIPPED | OUTPATIENT
Start: 2023-11-27 | End: 2024-01-11 | Stop reason: SDUPTHER

## 2023-11-27 NOTE — PROGRESS NOTES
Subjective     Patient ID: Marcella Dean is a 63 y.o. female who presents for Diabetes.    Referring Provider: ARASH Sofia     Diabetes  She presents for her follow-up diabetic visit. She has type 2 diabetes mellitus.     Patient reminded to reach out to Dr. Colindres or Dr Gomez for refill requests for blood pressure medications.    Allergies   Allergen Reactions    Codeine Nausea Only, Rash and Other     NAUSEA, GI Upset    Oxycodone-Acetaminophen Other       Objective     Current DM Pharmacotherapy:   Trulicity 0.75mg weekly    SECONDARY PREVENTION  - Statin? Yes  - ACE-I/ARB? Yes  - Aspirin? No    Current monitoring regimen:   Patient is using: glucometer    SMBG Readings:   Post meal 1-2 hours: 140s-180s    Any episodes of hypoglycemia? No  Hypoglycemia awareness? Yes    Pertinent PMH Review:  - PMH of Pancreatitis: No  - PMH/FH of Medullary Thyroid Cancer: No  - PMH of Retinopathy: Yes  - PMH of Urinary Tract Infections: No    Lab Review  Lab Results   Component Value Date    BILITOT 0.6 08/10/2023    CALCIUM 10.2 08/10/2023    CO2 30 08/10/2023     08/10/2023    CREATININE 0.72 08/10/2023    GLUCOSE 114 (H) 08/10/2023    ALKPHOS 55 08/10/2023    K 4.0 08/10/2023    PROT 7.7 08/10/2023     08/10/2023    AST 13 08/10/2023    ALT 9 08/10/2023    BUN 9 08/10/2023    ANIONGAP 12 08/10/2023    ALBUMIN 4.2 08/10/2023    LIPASE 29 08/10/2023    GFRF >90 08/10/2023     Lab Results   Component Value Date    TRIG 84 08/10/2023    CHOL 140 08/10/2023    HDL 39.6 (A) 08/10/2023     Lab Results   Component Value Date    HGBA1C 6.8 (A) 08/10/2023     The 10-year ASCVD risk score (Carrie QUIÑONEZ, et al., 2019) is: 12.2%    Values used to calculate the score:      Age: 63 years      Sex: Female      Is Non- : Yes      Diabetic: Yes      Tobacco smoker: No      Systolic Blood Pressure: 121 mmHg      Is BP treated: Yes      HDL Cholesterol: 39.6 mg/dL      Total Cholesterol:  140 mg/dL      Assessment/Plan     Problem List Items Addressed This Visit       Type 2 diabetes mellitus without complication, without long-term current use of insulin (CMS/[facility])     Patient reports having slight irritation from injection of Trulicity. She reports that this goes away over time and does not spread systemically.  Increase Trulicity 1.5mg dose weekly  Reported having no GI side effects to medications.  Patient stopped taking metformin, and wants to just take one medication for diabetes if possible.  Patient admits to not always checking blood sugar daily.   She will purposefully skip days that she eats large amounts of carbohydrates.  Discussed diet, they will try to avoid large servings of potatoes and other carbohydrate rich foods. They are okay with increasing protein and vegetable intake.  Patient is due for A1C check            Type 2 diabetes mellitus, is at goal. Recent A1C of 6.8%    Follow up: I recommend diabetes care be 3 weeks.    Rafael Can PharmD  PGY-1 Resident    Continue all meds under the continuation of care with the referring provider and clinical pharmacy team

## 2023-11-27 NOTE — ASSESSMENT & PLAN NOTE
Patient reports having slight irritation from injection of Trulicity. She reports that this goes away over time and does not spread systemically.  Increase Trulicity 1.5mg dose weekly  Reported having no GI side effects to medications.  Patient stopped taking metformin, and wants to just take one medication for diabetes if possible.  Patient admits to not always checking blood sugar daily.   She will purposefully skip days that she eats large amounts of carbohydrates.  Discussed diet, they will try to avoid large servings of potatoes and other carbohydrate rich foods. They are okay with increasing protein and vegetable intake.  Patient is due for A1C check

## 2024-01-04 ENCOUNTER — TELEMEDICINE (OUTPATIENT)
Dept: PHARMACY | Facility: HOSPITAL | Age: 64
End: 2024-01-04
Payer: MEDICARE

## 2024-01-04 DIAGNOSIS — E11.9 TYPE 2 DIABETES MELLITUS WITHOUT COMPLICATION, WITHOUT LONG-TERM CURRENT USE OF INSULIN (MULTI): ICD-10-CM

## 2024-01-05 NOTE — PROGRESS NOTES
Subjective     Patient ID: Marcella Dean is a 63 y.o. female who presents for Diabetes.    Referring Provider: ARASH Sofia     Diabetes  She presents for her follow-up diabetic visit. She has type 2 diabetes mellitus.     Patient has been more consistent about taking BG however is driving at time of call and unable to provide readings. States that she has felt very tired ever since increasing dose of Trulicity. However does have sleep apnea and has been unable to use CPAP machine due to not knowing how to put it together or wear it. States her daughter will be coming over to help.     Allergies   Allergen Reactions    Codeine Nausea Only, Rash and Other     NAUSEA, GI Upset    Oxycodone-Acetaminophen Other       Objective     Current DM Pharmacotherapy:   Trulicity 1.5 mg weekly    SECONDARY PREVENTION  - Statin? Yes  - ACE-I/ARB? Yes  - Aspirin? No    Current monitoring regimen:   Patient is using: glucometer    SMBG Readings:   Post meal 2 hours: 1191 mg/dL (only reading she could recall)     Any episodes of hypoglycemia? No  Hypoglycemia awareness? Yes    Pertinent PMH Review:  - PMH of Pancreatitis: No  - PMH/FH of Medullary Thyroid Cancer: No  - PMH of Retinopathy: Yes  - PMH of Urinary Tract Infections: No    Lab Review  Lab Results   Component Value Date    BILITOT 0.6 08/10/2023    CALCIUM 10.2 08/10/2023    CO2 30 08/10/2023     08/10/2023    CREATININE 0.72 08/10/2023    GLUCOSE 114 (H) 08/10/2023    ALKPHOS 55 08/10/2023    K 4.0 08/10/2023    PROT 7.7 08/10/2023     08/10/2023    AST 13 08/10/2023    ALT 9 08/10/2023    BUN 9 08/10/2023    ANIONGAP 12 08/10/2023    ALBUMIN 4.2 08/10/2023    LIPASE 29 08/10/2023    GFRF >90 08/10/2023     Lab Results   Component Value Date    TRIG 84 08/10/2023    CHOL 140 08/10/2023    HDL 39.6 (A) 08/10/2023     Lab Results   Component Value Date    HGBA1C 6.8 (A) 08/10/2023     The 10-year ASCVD risk score (Carrie QUIÑONEZ, et al., 2019) is:  12.2%    Values used to calculate the score:      Age: 63 years      Sex: Female      Is Non- : Yes      Diabetic: Yes      Tobacco smoker: No      Systolic Blood Pressure: 121 mmHg      Is BP treated: Yes      HDL Cholesterol: 39.6 mg/dL      Total Cholesterol: 140 mg/dL      Assessment/Plan     Problem List Items Addressed This Visit       Type 2 diabetes mellitus without complication, without long-term current use of insulin (CMS/[facility])       Discussed at length importance of updated labwork and PCP follow-up.     Type 2 diabetes mellitus, is at goal. Recent A1C of 6.8%    Follow up: I recommend diabetes care be 1 weeks.    Brittny Pittman PharmD [facility]  Clinical Pharmacist     Continue all meds under the continuation of care with the referring provider and clinical pharmacy team

## 2024-01-11 ENCOUNTER — TELEMEDICINE (OUTPATIENT)
Dept: PHARMACY | Facility: HOSPITAL | Age: 64
End: 2024-01-11
Payer: MEDICARE

## 2024-01-11 DIAGNOSIS — E11.9 TYPE 2 DIABETES MELLITUS WITHOUT COMPLICATION, WITHOUT LONG-TERM CURRENT USE OF INSULIN (MULTI): ICD-10-CM

## 2024-01-11 RX ORDER — DULAGLUTIDE 1.5 MG/.5ML
1.5 INJECTION, SOLUTION SUBCUTANEOUS
Qty: 2 ML | Refills: 1 | Status: SHIPPED | OUTPATIENT
Start: 2024-01-11

## 2024-01-11 NOTE — PROGRESS NOTES
Subjective     Patient ID: Mracella Dean is a 63 y.o. female who presents for Diabetes.    Referring Provider: ARASH Sofia     Diabetes  She presents for her follow-up diabetic visit. She has type 2 diabetes mellitus.     Patient has not yet set up CPAP machine, continues to experience significant daytime drowsiness. Strongly encouraged to start using.     Allergies   Allergen Reactions    Codeine Nausea Only, Rash and Other     NAUSEA, GI Upset    Oxycodone-Acetaminophen Other       Objective     Current DM Pharmacotherapy:   Trulicity 1.5 mg weekly    SECONDARY PREVENTION  - Statin? Yes  - ACE-I/ARB? Yes  - Aspirin? No    Current monitoring regimen:   Patient is using: glucometer    SMBG Readings:   Not assessed today     Any episodes of hypoglycemia? No  Hypoglycemia awareness? Yes    Pertinent PMH Review:  - PMH of Pancreatitis: No  - PMH/FH of Medullary Thyroid Cancer: No  - PMH of Retinopathy: Yes  - PMH of Urinary Tract Infections: No    Lab Review  Lab Results   Component Value Date    BILITOT 0.6 08/10/2023    CALCIUM 10.2 08/10/2023    CO2 30 08/10/2023     08/10/2023    CREATININE 0.72 08/10/2023    GLUCOSE 114 (H) 08/10/2023    ALKPHOS 55 08/10/2023    K 4.0 08/10/2023    PROT 7.7 08/10/2023     08/10/2023    AST 13 08/10/2023    ALT 9 08/10/2023    BUN 9 08/10/2023    ANIONGAP 12 08/10/2023    ALBUMIN 4.2 08/10/2023    LIPASE 29 08/10/2023    GFRF >90 08/10/2023     Lab Results   Component Value Date    TRIG 84 08/10/2023    CHOL 140 08/10/2023    HDL 39.6 (A) 08/10/2023     Lab Results   Component Value Date    HGBA1C 6.8 (A) 08/10/2023     The 10-year ASCVD risk score (Carrie QUIÑONEZ, et al., 2019) is: 12.2%    Values used to calculate the score:      Age: 63 years      Sex: Female      Is Non- : Yes      Diabetic: Yes      Tobacco smoker: No      Systolic Blood Pressure: 121 mmHg      Is BP treated: Yes      HDL Cholesterol: 39.6 mg/dL      Total  Cholesterol: 140 mg/dL      Assessment/Plan     Problem List Items Addressed This Visit       Type 2 diabetes mellitus without complication, without long-term current use of insulin (CMS/[facility])       Discussed at length importance of updated labwork and PCP follow-up.     Type 2 diabetes mellitus, is at goal. Recent A1C of 6.8%    Follow up: I recommend diabetes care be 2 weeks.    Brittny LarryD [facility]  Clinical Pharmacist     Continue all meds under the continuation of care with the referring provider and clinical pharmacy team

## 2024-01-18 ENCOUNTER — LAB (OUTPATIENT)
Dept: LAB | Facility: LAB | Age: 64
End: 2024-01-18
Payer: MEDICARE

## 2024-01-18 DIAGNOSIS — E11.9 TYPE 2 DIABETES MELLITUS WITHOUT COMPLICATION, WITHOUT LONG-TERM CURRENT USE OF INSULIN (MULTI): ICD-10-CM

## 2024-01-18 DIAGNOSIS — R79.89 ELEVATED PARATHYROID HORMONE: ICD-10-CM

## 2024-01-18 DIAGNOSIS — E78.2 MIXED HYPERLIPIDEMIA: ICD-10-CM

## 2024-01-18 LAB
ALBUMIN SERPL BCP-MCNC: 4.7 G/DL (ref 3.4–5)
ALP SERPL-CCNC: 56 U/L (ref 33–136)
ALT SERPL W P-5'-P-CCNC: 12 U/L (ref 7–45)
ANION GAP SERPL CALC-SCNC: 14 MMOL/L (ref 10–20)
AST SERPL W P-5'-P-CCNC: 13 U/L (ref 9–39)
BILIRUB SERPL-MCNC: 0.8 MG/DL (ref 0–1.2)
BUN SERPL-MCNC: 12 MG/DL (ref 6–23)
CALCIUM SERPL-MCNC: 10.6 MG/DL (ref 8.6–10.6)
CHLORIDE SERPL-SCNC: 102 MMOL/L (ref 98–107)
CHOLEST SERPL-MCNC: 119 MG/DL (ref 0–199)
CHOLESTEROL/HDL RATIO: 2.7
CO2 SERPL-SCNC: 30 MMOL/L (ref 21–32)
CREAT SERPL-MCNC: 0.69 MG/DL (ref 0.5–1.05)
EGFRCR SERPLBLD CKD-EPI 2021: >90 ML/MIN/1.73M*2
EST. AVERAGE GLUCOSE BLD GHB EST-MCNC: 137 MG/DL
GLUCOSE SERPL-MCNC: 111 MG/DL (ref 74–99)
HBA1C MFR BLD: 6.4 %
HDLC SERPL-MCNC: 44.2 MG/DL
LDLC SERPL CALC-MCNC: 64 MG/DL
NON HDL CHOLESTEROL: 75 MG/DL (ref 0–149)
POTASSIUM SERPL-SCNC: 3.7 MMOL/L (ref 3.5–5.3)
PROT SERPL-MCNC: 8.4 G/DL (ref 6.4–8.2)
SODIUM SERPL-SCNC: 142 MMOL/L (ref 136–145)
TRIGL SERPL-MCNC: 54 MG/DL (ref 0–149)
VLDL: 11 MG/DL (ref 0–40)

## 2024-01-18 PROCEDURE — 83036 HEMOGLOBIN GLYCOSYLATED A1C: CPT

## 2024-01-18 PROCEDURE — 82330 ASSAY OF CALCIUM: CPT

## 2024-01-18 PROCEDURE — 36415 COLL VENOUS BLD VENIPUNCTURE: CPT

## 2024-01-18 PROCEDURE — 80053 COMPREHEN METABOLIC PANEL: CPT

## 2024-01-18 PROCEDURE — 80061 LIPID PANEL: CPT

## 2024-01-19 LAB — CA-I BLD-SCNC: 1.36 MMOL/L (ref 1.1–1.33)

## 2024-01-20 DIAGNOSIS — R77.9 ELEVATED SERUM PROTEIN LEVEL: ICD-10-CM

## 2024-01-20 DIAGNOSIS — E83.52 HYPERCALCEMIA: Primary | ICD-10-CM

## 2024-01-22 ENCOUNTER — TELEMEDICINE (OUTPATIENT)
Dept: PHARMACY | Facility: HOSPITAL | Age: 64
End: 2024-01-22
Payer: MEDICARE

## 2024-01-22 ENCOUNTER — TELEPHONE (OUTPATIENT)
Dept: PRIMARY CARE | Facility: CLINIC | Age: 64
End: 2024-01-22

## 2024-01-22 DIAGNOSIS — E11.9 TYPE 2 DIABETES MELLITUS WITHOUT COMPLICATION, WITHOUT LONG-TERM CURRENT USE OF INSULIN (MULTI): ICD-10-CM

## 2024-01-22 RX ORDER — DULAGLUTIDE 0.75 MG/.5ML
0.75 INJECTION, SOLUTION SUBCUTANEOUS
Qty: 2 ML | Refills: 2 | Status: SHIPPED | OUTPATIENT
Start: 2024-01-22 | End: 2024-02-11 | Stop reason: WASHOUT

## 2024-01-22 NOTE — PROGRESS NOTES
Subjective     Patient ID: Marcella Dean is a 63 y.o. female who presents for No chief complaint on file..    Referring Provider: TARA Sofia-CNP     Diabetes  She presents for her follow-up diabetic visit. She has type 2 diabetes mellitus. Her disease course has been improving.     Patient has not yet set up CPAP machine, continues to experience significant daytime drowsiness. Strongly encouraged to start using.     Most recent A1C improved. Trulicity 1.5 mg currently on backorder, will decrease to 0.75 mg for now.     Allergies   Allergen Reactions    Codeine Nausea Only, Rash and Other     NAUSEA, GI Upset    Oxycodone-Acetaminophen Other       Objective     Current DM Pharmacotherapy:   Trulicity 1.5 mg weekly    SECONDARY PREVENTION  - Statin? Yes  - ACE-I/ARB? Yes  - Aspirin? No    Current monitoring regimen:   Patient is using: glucometer    SMBG Readings:   Not assessed today     Any episodes of hypoglycemia? No  Hypoglycemia awareness? Yes    Pertinent PMH Review:  - PMH of Pancreatitis: No  - PMH/FH of Medullary Thyroid Cancer: No  - PMH of Retinopathy: Yes  - PMH of Urinary Tract Infections: No    Lab Review  Lab Results   Component Value Date    BILITOT 0.8 01/18/2024    CALCIUM 10.6 01/18/2024    CO2 30 01/18/2024     01/18/2024    CREATININE 0.69 01/18/2024    GLUCOSE 111 (H) 01/18/2024    ALKPHOS 56 01/18/2024    K 3.7 01/18/2024    PROT 8.4 (H) 01/18/2024     01/18/2024    AST 13 01/18/2024    ALT 12 01/18/2024    BUN 12 01/18/2024    ANIONGAP 14 01/18/2024    ALBUMIN 4.7 01/18/2024    LIPASE 29 08/10/2023    GFRF >90 08/10/2023     Lab Results   Component Value Date    TRIG 54 01/18/2024    CHOL 119 01/18/2024    LDLCALC 64 01/18/2024    HDL 44.2 01/18/2024     Lab Results   Component Value Date    HGBA1C 6.4 (H) 01/18/2024     The ASCVD Risk score (Carrie QUIÑONEZ, et al., 2019) failed to calculate for the following reasons:    The valid total cholesterol range is 130 to 320  mg/dL      Assessment/Plan     Problem List Items Addressed This Visit       Type 2 diabetes mellitus without complication, without long-term current use of insulin (CMS/[facility])     Decrease to Trulicity 0.75 mg/0.5 mL - once weekly (Sundays)          Relevant Medications    dulaglutide (Trulicity) 0.75 mg/0.5 mL pen injector         Type 2 diabetes mellitus, is at goal. Recent A1C of 6.4%    Follow up: I recommend diabetes care be 4-8 weeks as needed by patient     Brittny LarryD [facility]  Clinical Pharmacist     Continue all meds under the continuation of care with the referring provider and clinical pharmacy team

## 2024-01-22 NOTE — TELEPHONE ENCOUNTER
Elham Reyes's patient.     Patient came into the office. She stated Trulicity .75 is available. 1.5 is not. Please, change script to .75 or prescribe something else comparable to Trulicity. She took the last injection last Sunday. Her vision is blurred and a little lightheaded.

## 2024-01-24 ENCOUNTER — TELEMEDICINE (OUTPATIENT)
Dept: PRIMARY CARE | Facility: CLINIC | Age: 64
End: 2024-01-24
Payer: MEDICARE

## 2024-01-24 DIAGNOSIS — I10 PRIMARY HYPERTENSION: ICD-10-CM

## 2024-01-24 DIAGNOSIS — R77.9 ELEVATED SERUM PROTEIN LEVEL: ICD-10-CM

## 2024-01-24 DIAGNOSIS — E66.9 TYPE 2 DIABETES MELLITUS WITH OBESITY (MULTI): Primary | ICD-10-CM

## 2024-01-24 DIAGNOSIS — L30.9 ECZEMA, UNSPECIFIED TYPE: ICD-10-CM

## 2024-01-24 DIAGNOSIS — M35.1 MIXED CONNECTIVE TISSUE DISEASE (MULTI): ICD-10-CM

## 2024-01-24 DIAGNOSIS — Z12.31 BREAST CANCER SCREENING BY MAMMOGRAM: ICD-10-CM

## 2024-01-24 DIAGNOSIS — M32.9 SYSTEMIC LUPUS ERYTHEMATOSUS, UNSPECIFIED SLE TYPE, UNSPECIFIED ORGAN INVOLVEMENT STATUS (MULTI): ICD-10-CM

## 2024-01-24 DIAGNOSIS — E11.9 TYPE 2 DIABETES MELLITUS WITHOUT COMPLICATION, WITHOUT LONG-TERM CURRENT USE OF INSULIN (MULTI): ICD-10-CM

## 2024-01-24 DIAGNOSIS — Z79.899 LONG-TERM USE OF HYDROXYCHLOROQUINE: ICD-10-CM

## 2024-01-24 DIAGNOSIS — Z01.83 BLOOD TYPING ENCOUNTER: Primary | ICD-10-CM

## 2024-01-24 DIAGNOSIS — E11.69 TYPE 2 DIABETES MELLITUS WITH OBESITY (MULTI): Primary | ICD-10-CM

## 2024-01-24 DIAGNOSIS — Z12.11 SCREENING FOR COLON CANCER: ICD-10-CM

## 2024-01-24 DIAGNOSIS — M35.1 MCTD (MIXED CONNECTIVE TISSUE DISEASE) (MULTI): ICD-10-CM

## 2024-01-24 DIAGNOSIS — J45.40 MODERATE PERSISTENT ASTHMA, UNSPECIFIED WHETHER COMPLICATED (HHS-HCC): ICD-10-CM

## 2024-01-24 PROCEDURE — 3044F HG A1C LEVEL LT 7.0%: CPT | Performed by: NURSE PRACTITIONER

## 2024-01-24 PROCEDURE — 99214 OFFICE O/P EST MOD 30 MIN: CPT | Performed by: NURSE PRACTITIONER

## 2024-01-24 PROCEDURE — 1036F TOBACCO NON-USER: CPT | Performed by: NURSE PRACTITIONER

## 2024-01-24 PROCEDURE — 3048F LDL-C <100 MG/DL: CPT | Performed by: NURSE PRACTITIONER

## 2024-01-24 PROCEDURE — 4010F ACE/ARB THERAPY RXD/TAKEN: CPT | Performed by: NURSE PRACTITIONER

## 2024-01-24 RX ORDER — DESONIDE 0.5 MG/G
CREAM TOPICAL
Qty: 60 G | Refills: 2 | Status: SHIPPED | OUTPATIENT
Start: 2024-01-24

## 2024-01-24 RX ORDER — HYDROCHLOROTHIAZIDE 12.5 MG/1
12.5 TABLET ORAL 2 TIMES DAILY
Qty: 60 TABLET | Refills: 0 | Status: SHIPPED | OUTPATIENT
Start: 2024-01-24 | End: 2024-03-11

## 2024-01-24 RX ORDER — ALBUTEROL SULFATE 90 UG/1
AEROSOL, METERED RESPIRATORY (INHALATION)
Qty: 18 G | Refills: 2 | Status: SHIPPED | OUTPATIENT
Start: 2024-01-24 | End: 2024-02-06 | Stop reason: SDUPTHER

## 2024-01-24 RX ORDER — HYDROXYCHLOROQUINE SULFATE 200 MG/1
200 TABLET, FILM COATED ORAL 2 TIMES DAILY
Qty: 180 TABLET | Refills: 1 | Status: SHIPPED | OUTPATIENT
Start: 2024-01-24

## 2024-01-24 RX ORDER — GABAPENTIN 100 MG/1
100 CAPSULE ORAL 3 TIMES DAILY
Qty: 90 CAPSULE | Refills: 5 | Status: SHIPPED | OUTPATIENT
Start: 2024-01-24 | End: 2024-07-22

## 2024-01-24 RX ORDER — TRIAMCINOLONE ACETONIDE 1 MG/G
1 CREAM TOPICAL 3 TIMES DAILY
Qty: 453.6 G | Refills: 2 | Status: SHIPPED | OUTPATIENT
Start: 2024-01-24

## 2024-01-24 RX ORDER — ROSUVASTATIN CALCIUM 5 MG/1
5 TABLET, COATED ORAL DAILY
Qty: 30 TABLET | Refills: 0 | Status: SHIPPED | OUTPATIENT
Start: 2024-01-24 | End: 2024-03-11

## 2024-01-24 RX ORDER — MONTELUKAST SODIUM 10 MG/1
10 TABLET ORAL NIGHTLY
Qty: 90 TABLET | Refills: 2 | Status: SHIPPED | OUTPATIENT
Start: 2024-01-24

## 2024-01-24 RX ORDER — MELOXICAM 15 MG/1
15 TABLET ORAL DAILY
Qty: 90 TABLET | Refills: 1 | Status: SHIPPED | OUTPATIENT
Start: 2024-01-24

## 2024-01-24 RX ORDER — LOSARTAN POTASSIUM 50 MG/1
50 TABLET ORAL 2 TIMES DAILY
Qty: 60 TABLET | Refills: 0 | Status: SHIPPED | OUTPATIENT
Start: 2024-01-24 | End: 2024-05-31

## 2024-01-25 ENCOUNTER — APPOINTMENT (OUTPATIENT)
Dept: PHARMACY | Facility: HOSPITAL | Age: 64
End: 2024-01-25
Payer: MEDICARE

## 2024-02-06 ENCOUNTER — TELEPHONE (OUTPATIENT)
Dept: PRIMARY CARE | Facility: CLINIC | Age: 64
End: 2024-02-06

## 2024-02-06 DIAGNOSIS — J45.40 MODERATE PERSISTENT ASTHMA, UNSPECIFIED WHETHER COMPLICATED (HHS-HCC): Primary | ICD-10-CM

## 2024-02-06 RX ORDER — ALBUTEROL SULFATE 0.83 MG/ML
2.5 SOLUTION RESPIRATORY (INHALATION) 4 TIMES DAILY PRN
Qty: 75 ML | Refills: 1 | Status: SHIPPED | OUTPATIENT
Start: 2024-02-06

## 2024-02-06 RX ORDER — ALBUTEROL SULFATE 90 UG/1
AEROSOL, METERED RESPIRATORY (INHALATION)
Qty: 6.7 G | Refills: 2 | Status: SHIPPED | OUTPATIENT
Start: 2024-02-06

## 2024-02-06 NOTE — TELEPHONE ENCOUNTER
[facility] called in regarding patients Rx refill for Ventolin HFA 90 mcg/actuation inhaler this refill is on Ventolin HFA 90 mcg/actuation inhaler                       Pharmacy is [facility] [phone]

## 2024-02-29 DIAGNOSIS — E11.9 TYPE 2 DIABETES MELLITUS WITHOUT COMPLICATION, WITHOUT LONG-TERM CURRENT USE OF INSULIN (MULTI): Primary | ICD-10-CM

## 2024-02-29 RX ORDER — DULAGLUTIDE 0.75 MG/.5ML
0.75 INJECTION, SOLUTION SUBCUTANEOUS
Qty: 2 ML | Refills: 1 | OUTPATIENT
Start: 2024-02-29

## 2024-02-29 NOTE — TELEPHONE ENCOUNTER
Trulicity 1.5 mg still on backorder- [facility] e-scripts down. Trulicity 0.75 mg strength phoned into pharmacy.    Brittny Pittman PharmD [facility]  Clinical Pharmacy Specialist, Primary Care

## 2024-03-08 DIAGNOSIS — I10 PRIMARY HYPERTENSION: ICD-10-CM

## 2024-03-08 DIAGNOSIS — E11.9 TYPE 2 DIABETES MELLITUS WITHOUT COMPLICATION, WITHOUT LONG-TERM CURRENT USE OF INSULIN (MULTI): ICD-10-CM

## 2024-03-11 RX ORDER — ROSUVASTATIN CALCIUM 5 MG/1
5 TABLET, COATED ORAL DAILY
Qty: 30 TABLET | Refills: 0 | Status: SHIPPED | OUTPATIENT
Start: 2024-03-11 | End: 2024-05-02

## 2024-03-11 RX ORDER — HYDROCHLOROTHIAZIDE 12.5 MG/1
12.5 TABLET ORAL 2 TIMES DAILY
Qty: 90 TABLET | Refills: 0 | Status: SHIPPED | OUTPATIENT
Start: 2024-03-11 | End: 2024-06-04

## 2024-04-04 ENCOUNTER — TELEPHONE (OUTPATIENT)
Dept: PHARMACY | Facility: HOSPITAL | Age: 64
End: 2024-04-04
Payer: MEDICARE

## 2024-04-04 NOTE — TELEPHONE ENCOUNTER
Population Health: Outreach by Ambulatory Pharmacy Team    Payor: Blanca LANZA  Reason: Adherence  Medication: Trulicity 0.75mg  Outcome: Left Voicemail    Mariela Hedrick, PharmD

## 2024-04-24 ENCOUNTER — TELEPHONE (OUTPATIENT)
Dept: PHARMACY | Facility: HOSPITAL | Age: 64
End: 2024-04-24
Payer: MEDICARE

## 2024-04-24 NOTE — TELEPHONE ENCOUNTER
Population Health: Outreach by Ambulatory Pharmacy Team    Payor: Blanca LANZA  Reason: Adherence  Medication: losartan 50 mg   Outcome: Left Voicemail    Additional Notes: No indication that medication was discontinued/stopped. Attempted to reach patient to refill medication, determine if there were questions/concerns, and try to switch patient to  Home Delivery.   Zena Diane, PharmD

## 2024-05-01 DIAGNOSIS — E11.9 TYPE 2 DIABETES MELLITUS WITHOUT COMPLICATION, WITHOUT LONG-TERM CURRENT USE OF INSULIN (MULTI): ICD-10-CM

## 2024-05-02 RX ORDER — ROSUVASTATIN CALCIUM 5 MG/1
5 TABLET, COATED ORAL DAILY
Qty: 30 TABLET | Refills: 0 | Status: SHIPPED | OUTPATIENT
Start: 2024-05-02 | End: 2024-06-04

## 2024-05-26 DIAGNOSIS — I10 PRIMARY HYPERTENSION: ICD-10-CM

## 2024-05-31 RX ORDER — LOSARTAN POTASSIUM 50 MG/1
50 TABLET ORAL 2 TIMES DAILY
Qty: 90 TABLET | Refills: 0 | Status: SHIPPED | OUTPATIENT
Start: 2024-05-31

## 2024-06-04 DIAGNOSIS — I10 PRIMARY HYPERTENSION: ICD-10-CM

## 2024-06-04 DIAGNOSIS — E11.9 TYPE 2 DIABETES MELLITUS WITHOUT COMPLICATION, WITHOUT LONG-TERM CURRENT USE OF INSULIN (MULTI): ICD-10-CM

## 2024-06-04 RX ORDER — ROSUVASTATIN CALCIUM 5 MG/1
5 TABLET, COATED ORAL DAILY
Qty: 90 TABLET | Refills: 0 | Status: SHIPPED | OUTPATIENT
Start: 2024-06-04

## 2024-06-04 RX ORDER — HYDROCHLOROTHIAZIDE 12.5 MG/1
12.5 TABLET ORAL 2 TIMES DAILY
Qty: 90 TABLET | Refills: 0 | Status: SHIPPED | OUTPATIENT
Start: 2024-06-04

## 2024-07-10 DIAGNOSIS — E11.9 TYPE 2 DIABETES MELLITUS WITHOUT COMPLICATION, WITHOUT LONG-TERM CURRENT USE OF INSULIN (MULTI): ICD-10-CM

## 2024-07-10 RX ORDER — DULAGLUTIDE 1.5 MG/.5ML
INJECTION, SOLUTION SUBCUTANEOUS
Qty: 2 ML | Refills: 0 | Status: SHIPPED | OUTPATIENT
Start: 2024-07-10

## 2024-07-15 DIAGNOSIS — Z12.11 COLON CANCER SCREENING: ICD-10-CM

## 2024-07-15 RX ORDER — POLYETHYLENE GLYCOL 3350, SODIUM CHLORIDE, SODIUM BICARBONATE, POTASSIUM CHLORIDE 420; 11.2; 5.72; 1.48 G/4L; G/4L; G/4L; G/4L
POWDER, FOR SOLUTION ORAL
Qty: 4000 ML | Refills: 0 | Status: SHIPPED | OUTPATIENT
Start: 2024-07-15

## 2024-07-16 ENCOUNTER — APPOINTMENT (OUTPATIENT)
Dept: RADIOLOGY | Facility: CLINIC | Age: 64
End: 2024-07-16
Payer: MEDICARE

## 2024-07-16 ENCOUNTER — HOSPITAL ENCOUNTER (OUTPATIENT)
Dept: RADIOLOGY | Facility: CLINIC | Age: 64
Discharge: HOME | End: 2024-07-16
Payer: MEDICARE

## 2024-07-16 DIAGNOSIS — Z12.31 BREAST CANCER SCREENING BY MAMMOGRAM: ICD-10-CM

## 2024-07-16 PROCEDURE — 77067 SCR MAMMO BI INCL CAD: CPT | Performed by: RADIOLOGY

## 2024-07-16 PROCEDURE — 77063 BREAST TOMOSYNTHESIS BI: CPT | Performed by: RADIOLOGY

## 2024-07-16 PROCEDURE — 77063 BREAST TOMOSYNTHESIS BI: CPT

## 2024-07-24 ENCOUNTER — TELEPHONE (OUTPATIENT)
Dept: PRIMARY CARE | Facility: CLINIC | Age: 64
End: 2024-07-24

## 2024-08-02 ENCOUNTER — LAB (OUTPATIENT)
Dept: LAB | Facility: LAB | Age: 64
End: 2024-08-02
Payer: MEDICARE

## 2024-08-02 ENCOUNTER — APPOINTMENT (OUTPATIENT)
Dept: PRIMARY CARE | Facility: CLINIC | Age: 64
End: 2024-08-02
Payer: MEDICARE

## 2024-08-02 VITALS
WEIGHT: 167 LBS | RESPIRATION RATE: 12 BRPM | DIASTOLIC BLOOD PRESSURE: 70 MMHG | HEIGHT: 66 IN | OXYGEN SATURATION: 98 % | HEART RATE: 75 BPM | TEMPERATURE: 98.2 F | BODY MASS INDEX: 26.84 KG/M2 | SYSTOLIC BLOOD PRESSURE: 112 MMHG

## 2024-08-02 DIAGNOSIS — Z00.00 MEDICARE ANNUAL WELLNESS VISIT, SUBSEQUENT: Primary | ICD-10-CM

## 2024-08-02 DIAGNOSIS — G47.33 OSA (OBSTRUCTIVE SLEEP APNEA): ICD-10-CM

## 2024-08-02 DIAGNOSIS — R77.9 ELEVATED SERUM PROTEIN LEVEL: ICD-10-CM

## 2024-08-02 DIAGNOSIS — H91.93 BILATERAL HEARING LOSS, UNSPECIFIED HEARING LOSS TYPE: ICD-10-CM

## 2024-08-02 DIAGNOSIS — L30.9 ECZEMA, UNSPECIFIED TYPE: ICD-10-CM

## 2024-08-02 DIAGNOSIS — E83.52 HYPERCALCEMIA: ICD-10-CM

## 2024-08-02 DIAGNOSIS — E11.9 TYPE 2 DIABETES MELLITUS WITHOUT COMPLICATION, WITHOUT LONG-TERM CURRENT USE OF INSULIN (MULTI): ICD-10-CM

## 2024-08-02 DIAGNOSIS — R05.1 ACUTE COUGH: ICD-10-CM

## 2024-08-02 DIAGNOSIS — Z01.83 BLOOD TYPING ENCOUNTER: ICD-10-CM

## 2024-08-02 DIAGNOSIS — T78.40XA ALLERGIC REACTION TO DRUG, INITIAL ENCOUNTER: ICD-10-CM

## 2024-08-02 DIAGNOSIS — E55.9 VITAMIN D DEFICIENCY: ICD-10-CM

## 2024-08-02 LAB
ABO GROUP (TYPE) IN BLOOD: NORMAL
PROT SERPL-MCNC: 7.1 G/DL (ref 6.4–8.2)
RH FACTOR (ANTIGEN D): NORMAL

## 2024-08-02 PROCEDURE — 36415 COLL VENOUS BLD VENIPUNCTURE: CPT

## 2024-08-02 PROCEDURE — 4010F ACE/ARB THERAPY RXD/TAKEN: CPT | Performed by: NURSE PRACTITIONER

## 2024-08-02 PROCEDURE — 83970 ASSAY OF PARATHORMONE: CPT

## 2024-08-02 PROCEDURE — 3008F BODY MASS INDEX DOCD: CPT | Performed by: NURSE PRACTITIONER

## 2024-08-02 PROCEDURE — 84165 PROTEIN E-PHORESIS SERUM: CPT

## 2024-08-02 PROCEDURE — 86901 BLOOD TYPING SEROLOGIC RH(D): CPT

## 2024-08-02 PROCEDURE — 3044F HG A1C LEVEL LT 7.0%: CPT | Performed by: NURSE PRACTITIONER

## 2024-08-02 PROCEDURE — 3078F DIAST BP <80 MM HG: CPT | Performed by: NURSE PRACTITIONER

## 2024-08-02 PROCEDURE — 99214 OFFICE O/P EST MOD 30 MIN: CPT | Performed by: NURSE PRACTITIONER

## 2024-08-02 PROCEDURE — 3048F LDL-C <100 MG/DL: CPT | Performed by: NURSE PRACTITIONER

## 2024-08-02 PROCEDURE — 84155 ASSAY OF PROTEIN SERUM: CPT

## 2024-08-02 PROCEDURE — 86900 BLOOD TYPING SEROLOGIC ABO: CPT

## 2024-08-02 PROCEDURE — 3074F SYST BP LT 130 MM HG: CPT | Performed by: NURSE PRACTITIONER

## 2024-08-02 RX ORDER — BLOOD SUGAR DIAGNOSTIC
STRIP MISCELLANEOUS
Qty: 100 STRIP | Refills: 2 | Status: SHIPPED | OUTPATIENT
Start: 2024-08-02

## 2024-08-02 RX ORDER — FAMOTIDINE 20 MG/1
20 TABLET, FILM COATED ORAL 2 TIMES DAILY
Qty: 60 TABLET | Refills: 3 | Status: SHIPPED | OUTPATIENT
Start: 2024-08-02 | End: 2025-08-02

## 2024-08-02 RX ORDER — BENZONATATE 200 MG/1
200 CAPSULE ORAL 3 TIMES DAILY PRN
Qty: 42 CAPSULE | Refills: 1 | Status: SHIPPED | OUTPATIENT
Start: 2024-08-02 | End: 2024-09-01

## 2024-08-02 RX ORDER — ERGOCALCIFEROL 1.25 MG/1
50000 CAPSULE ORAL
Qty: 42 CAPSULE | Refills: 1 | Status: SHIPPED | OUTPATIENT
Start: 2024-08-02

## 2024-08-02 RX ORDER — TRIAMCINOLONE ACETONIDE 1 MG/G
OINTMENT TOPICAL 2 TIMES DAILY PRN
Qty: 80 G | Refills: 0 | Status: SHIPPED | OUTPATIENT
Start: 2024-08-02 | End: 2024-11-30

## 2024-08-02 RX ORDER — CETIRIZINE HYDROCHLORIDE 10 MG/1
10 TABLET ORAL DAILY
Qty: 30 TABLET | Refills: 5 | Status: SHIPPED | OUTPATIENT
Start: 2024-08-02 | End: 2025-01-29

## 2024-08-02 ASSESSMENT — PATIENT HEALTH QUESTIONNAIRE - PHQ9
1. LITTLE INTEREST OR PLEASURE IN DOING THINGS: NOT AT ALL
2. FEELING DOWN, DEPRESSED OR HOPELESS: SEVERAL DAYS
1. LITTLE INTEREST OR PLEASURE IN DOING THINGS: SEVERAL DAYS
2. FEELING DOWN, DEPRESSED OR HOPELESS: NOT AT ALL
SUM OF ALL RESPONSES TO PHQ9 QUESTIONS 1 AND 2: 0
SUM OF ALL RESPONSES TO PHQ9 QUESTIONS 1 AND 2: 2

## 2024-08-02 ASSESSMENT — ACTIVITIES OF DAILY LIVING (ADL)
DRESSING: INDEPENDENT
BATHING: INDEPENDENT
TAKING_MEDICATION: INDEPENDENT
MANAGING_FINANCES: INDEPENDENT
DRESSING: INDEPENDENT
DOING_HOUSEWORK: INDEPENDENT
GROCERY_SHOPPING: INDEPENDENT

## 2024-08-02 NOTE — PROGRESS NOTES
"Subjective   Patient ID: Marcella Dean is a 63 y.o. female who presents for Follow-up (Follow up-Pt has dizziness. Pt has been experiencing possibly allergic reaction to trulicy injection- pt experiences swelling.).    HPI the patient gets dizzy if she turns to fast , this happens frequently  She had a cough  She has taken albuterol prn  She has asthma like symptoms as an adult and comes with seasonal changes   She has an allergic reaction to trulicity since   She is helping her family members   Her mother had vascular dementia she passed away at 91 she had dementia   She does [employer]  and [employer] for [employer]   She has silver sneaker she can participate with related to her insurance plan   She cannot hear well  and would like to get an audiology appointment   She started trulicity and has lost some weight   I advised her to check her blood pressure at home as it may drop if she is still losing weight   The patient came in for an office visit got her vitals done and then completed it with a video visit   Review of Systems.Negative except what was mentioned in the HPI         Objective   /70 (Patient Position: Sitting)   Pulse 75   Temp 36.8 °C (98.2 °F)   Resp 12   Ht 1.676 m (5' 6\")   Wt 75.8 kg (167 lb)   SpO2 98%   BMI 26.95 kg/m²     Physical Exam  Vitals and nursing note reviewed.   Constitutional:       Appearance: Normal appearance.   HENT:      Head: Normocephalic and atraumatic.   Pulmonary:      Effort: Pulmonary effort is normal.   Musculoskeletal:      Cervical back: Normal range of motion and neck supple.   Skin:     General: Skin is dry.   Neurological:      Mental Status: She is alert and oriented to person, place, and time.   Psychiatric:         Mood and Affect: Mood normal.         Behavior: Behavior normal.         Thought Content: Thought content normal.         Assessment/Plan   Problem List Items Addressed This Visit             ICD-10-CM    Type 2 diabetes mellitus without " complication, without long-term current use of insulin (Multi) E11.9    Relevant Medications    OneTouch Ultra Test strip    VIOLETA (obstructive sleep apnea) G47.33    Relevant Orders    Referral to Adult Sleep Medicine    Hearing loss H91.90    Relevant Orders    Referral to Audiology    Vitamin D deficiency E55.9    Relevant Medications    ergocalciferol (Vitamin D-2) 1.25 MG (07405 UT) capsule     Other Visit Diagnoses         Codes    Medicare annual wellness visit, subsequent    -  Primary Z00.00    Acute cough     R05.1    Relevant Medications    benzonatate (Tessalon) 200 mg capsule    Allergic reaction to drug, initial encounter     T78.40XA    Relevant Medications    famotidine (Pepcid) 20 mg tablet    cetirizine (ZyrTEC) 10 mg tablet    triamcinolone (Kenalog) 0.1 % ointment    Eczema, unspecified type     L30.9    Relevant Medications    triamcinolone (Kenalog) 0.1 % ointment

## 2024-08-03 LAB — PTH-INTACT SERPL-MCNC: 84.2 PG/ML (ref 18.5–88)

## 2024-08-06 LAB
ALBUMIN: 3.8 G/DL (ref 3.4–5)
ALPHA 1 GLOBULIN: 0.3 G/DL (ref 0.2–0.6)
ALPHA 2 GLOBULIN: 0.7 G/DL (ref 0.4–1.1)
BETA GLOBULIN: 0.9 G/DL (ref 0.5–1.2)
GAMMA GLOBULIN: 1.4 G/DL (ref 0.5–1.4)
PATH REVIEW-SERUM PROTEIN ELECTROPHORESIS: NORMAL
PROTEIN ELECTROPHORESIS COMMENT: NORMAL

## 2024-08-07 ENCOUNTER — APPOINTMENT (OUTPATIENT)
Dept: PRIMARY CARE | Facility: CLINIC | Age: 64
End: 2024-08-07
Payer: MEDICARE

## 2024-08-26 DIAGNOSIS — E11.9 TYPE 2 DIABETES MELLITUS WITHOUT COMPLICATION, WITHOUT LONG-TERM CURRENT USE OF INSULIN (MULTI): ICD-10-CM

## 2024-08-27 RX ORDER — DULAGLUTIDE 1.5 MG/.5ML
1.5 INJECTION, SOLUTION SUBCUTANEOUS
Qty: 2 ML | Refills: 11 | Status: SHIPPED | OUTPATIENT
Start: 2024-08-27

## 2024-09-13 DIAGNOSIS — I10 PRIMARY HYPERTENSION: ICD-10-CM

## 2024-09-13 DIAGNOSIS — E11.9 TYPE 2 DIABETES MELLITUS WITHOUT COMPLICATION, WITHOUT LONG-TERM CURRENT USE OF INSULIN (MULTI): ICD-10-CM

## 2024-09-14 RX ORDER — ROSUVASTATIN CALCIUM 5 MG/1
5 TABLET, COATED ORAL DAILY
Qty: 90 TABLET | Refills: 2 | Status: SHIPPED | OUTPATIENT
Start: 2024-09-14

## 2024-09-14 RX ORDER — HYDROCHLOROTHIAZIDE 12.5 MG/1
12.5 TABLET ORAL 2 TIMES DAILY
Qty: 90 TABLET | Refills: 2 | Status: SHIPPED | OUTPATIENT
Start: 2024-09-14

## 2024-09-14 RX ORDER — LOSARTAN POTASSIUM 50 MG/1
50 TABLET ORAL 2 TIMES DAILY
Qty: 90 TABLET | Refills: 2 | Status: SHIPPED | OUTPATIENT
Start: 2024-09-14

## 2024-09-19 ENCOUNTER — APPOINTMENT (OUTPATIENT)
Dept: GASTROENTEROLOGY | Facility: EXTERNAL LOCATION | Age: 64
End: 2024-09-19
Payer: MEDICARE

## 2024-10-10 ENCOUNTER — APPOINTMENT (OUTPATIENT)
Dept: GASTROENTEROLOGY | Facility: EXTERNAL LOCATION | Age: 64
End: 2024-10-10
Payer: MEDICARE

## 2025-01-02 DIAGNOSIS — E11.9 TYPE 2 DIABETES MELLITUS WITHOUT COMPLICATION, WITHOUT LONG-TERM CURRENT USE OF INSULIN (MULTI): ICD-10-CM

## 2025-01-02 RX ORDER — DULAGLUTIDE 1.5 MG/.5ML
1.5 INJECTION, SOLUTION SUBCUTANEOUS
Qty: 2 ML | Refills: 11 | Status: SHIPPED | OUTPATIENT
Start: 2025-01-02

## 2025-08-06 ENCOUNTER — APPOINTMENT (OUTPATIENT)
Dept: PRIMARY CARE | Facility: CLINIC | Age: 65
End: 2025-08-06
Payer: MEDICARE

## 2025-09-02 ENCOUNTER — APPOINTMENT (OUTPATIENT)
Dept: PRIMARY CARE | Facility: CLINIC | Age: 65
End: 2025-09-02
Payer: MEDICARE

## 2025-12-02 ENCOUNTER — APPOINTMENT (OUTPATIENT)
Dept: PRIMARY CARE | Facility: CLINIC | Age: 65
End: 2025-12-02
Payer: MEDICARE